# Patient Record
Sex: MALE | Employment: STUDENT | URBAN - METROPOLITAN AREA
[De-identification: names, ages, dates, MRNs, and addresses within clinical notes are randomized per-mention and may not be internally consistent; named-entity substitution may affect disease eponyms.]

---

## 2021-10-01 ENCOUNTER — HOSPITAL ENCOUNTER (EMERGENCY)
Facility: CLINIC | Age: 21
Discharge: HOME | End: 2021-10-01
Attending: INTERNAL MEDICINE | Admitting: INTERNAL MEDICINE

## 2021-10-01 ENCOUNTER — APPOINTMENT (OUTPATIENT)
Dept: GENERAL RADIOLOGY | Facility: CLINIC | Age: 21
End: 2021-10-01
Attending: FAMILY MEDICINE

## 2021-10-01 VITALS
DIASTOLIC BLOOD PRESSURE: 74 MMHG | HEART RATE: 82 BPM | SYSTOLIC BLOOD PRESSURE: 113 MMHG | TEMPERATURE: 98.8 F | OXYGEN SATURATION: 95 % | RESPIRATION RATE: 16 BRPM

## 2021-10-01 DIAGNOSIS — S60.221A CONTUSION OF RIGHT HAND, INITIAL ENCOUNTER: ICD-10-CM

## 2021-10-01 PROCEDURE — 73130 X-RAY EXAM OF HAND: CPT | Mod: 26 | Performed by: RADIOLOGY

## 2021-10-01 PROCEDURE — 99283 EMERGENCY DEPT VISIT LOW MDM: CPT | Performed by: INTERNAL MEDICINE

## 2021-10-01 PROCEDURE — 73100 X-RAY EXAM OF WRIST: CPT | Mod: RT,52

## 2021-10-01 PROCEDURE — 99284 EMERGENCY DEPT VISIT MOD MDM: CPT

## 2021-10-01 PROCEDURE — 73130 X-RAY EXAM OF HAND: CPT | Mod: RT

## 2021-10-01 PROCEDURE — 73100 X-RAY EXAM OF WRIST: CPT | Mod: 26 | Performed by: RADIOLOGY

## 2021-10-01 PROCEDURE — 73090 X-RAY EXAM OF FOREARM: CPT | Mod: 26 | Performed by: RADIOLOGY

## 2021-10-01 PROCEDURE — 73090 X-RAY EXAM OF FOREARM: CPT | Mod: RT

## 2021-10-01 RX ORDER — IBUPROFEN 600 MG/1
600 TABLET, FILM COATED ORAL ONCE
Status: DISCONTINUED | OUTPATIENT
Start: 2021-10-01 | End: 2021-10-01 | Stop reason: HOSPADM

## 2021-10-01 ASSESSMENT — ENCOUNTER SYMPTOMS
JOINT SWELLING: 0
ABDOMINAL PAIN: 0
SHORTNESS OF BREATH: 0
FEVER: 0
ARTHRALGIAS: 1
WEAKNESS: 0
NUMBNESS: 0

## 2021-10-01 NOTE — ED TRIAGE NOTES
Pt presents to ER with concerns of broken hand. Pt punched a wall after being angry approx 4hrs ago. Pt complains of pain in his right knucles, top of his hand, wrist, and forearm. Pt complains of tingling in his middle, ring, and pinky finger on his right hand. Remaining CMS intact. Pt is VSS in triage.

## 2021-10-02 NOTE — ED PROVIDER NOTES
ED Provider Note  [facility]      History     Chief Complaint   Patient presents with     Hand Pain     HPI  Milo Mcgovern is a 20 year old male who presents with right hand pain after punching a wall in anger 4 hours PTA. He has pain in the MCP knuckles of the middle, ring and little fingers. He has pain on movement of the fingers, but no numbness or weakness. He has no pain in the wrist of forearm, though the hand pain radiates somewhat proximally.    No past medical history on file.    No past surgical history on file.    No family history on file.    Social History     Tobacco Use     Smoking status: Not on file   Substance Use Topics     Alcohol use: Not on file        Review of Systems   Constitutional: Negative for fever.   Respiratory: Negative for shortness of breath.    Cardiovascular: Negative for chest pain.   Gastrointestinal: Negative for abdominal pain.   Musculoskeletal: Positive for arthralgias. Negative for joint swelling.   Neurological: Negative for weakness and numbness.   All other systems reviewed and are negative.        Physical Exam   BP: 113/74  Pulse: 82  Temp: 98.8  F (37.1  C)  Resp: 16  SpO2: 95 %  Physical Exam  Vitals and nursing note reviewed.   HENT:      Head: Normocephalic.      Right Ear: External ear normal.      Left Ear: External ear normal.      Nose: Nose normal.      Mouth/Throat:      Mouth: Mucous membranes are moist.   Eyes:      Extraocular Movements: Extraocular movements intact.      Pupils: Pupils are equal, round, and reactive to light.   Cardiovascular:      Rate and Rhythm: Normal rate.   Pulmonary:      Effort: Pulmonary effort is normal.   Musculoskeletal:         General: Swelling and tenderness present.      Right elbow: No swelling or deformity. No tenderness.      Right forearm: No swelling, deformity or tenderness.      Right wrist: Normal. No swelling, deformity or tenderness.      Right hand: Swelling and tenderness  present. No deformity. Decreased range of motion. Normal strength. Normal sensation. There is no disruption of two-point discrimination. Normal capillary refill.      Left hand: Normal.      Cervical back: Normal range of motion.   Skin:     General: Skin is warm and dry.      Comments: No hand wounds   Neurological:      General: No focal deficit present.      Mental Status: He is alert.   Psychiatric:         Mood and Affect: Mood normal.         Behavior: Behavior normal.         ED Course      Procedures       Labs/Imaging    Results for orders placed or performed during the hospital encounter of 10/01/21 (from the past 24 hour(s))   XR Wrist Right 1 View    Narrative    EXAM: XR FOREARM RIGHT 2 VIEWS, XR WRIST RIGHT 1 VIEW, XR HAND RT G/E 3 VW  LOCATION: [facility]  DATE/TIME: 10/1/2021 7:32 PM    INDICATION: Right forearm, wrist, and hand pain after trauma.  COMPARISON: None.      Impression    IMPRESSION: Right forearm, wrist, and hand. No fracture, erosion, or bone lesion. Normal joint alignment and spacing. Normal soft tissues.   XR Hand Right G/E 3 Views    Narrative    EXAM: XR FOREARM RIGHT 2 VIEWS, XR WRIST RIGHT 1 VIEW, XR HAND RT G/E 3 VW  LOCATION: [facility]  DATE/TIME: 10/1/2021 7:32 PM    INDICATION: Right forearm, wrist, and hand pain after trauma.  COMPARISON: None.      Impression    IMPRESSION: Right forearm, wrist, and hand. No fracture, erosion, or bone lesion. Normal joint alignment and spacing. Normal soft tissues.   XR Forearm Right 2 Views    Narrative    EXAM: XR FOREARM RIGHT 2 VIEWS, XR WRIST RIGHT 1 VIEW, XR HAND RT G/E 3 VW  LOCATION: [facility]  DATE/TIME: 10/1/2021 7:32 PM    INDICATION: Right forearm, wrist, and hand pain after trauma.  COMPARISON: None.      Impression    IMPRESSION: Right forearm, wrist, and hand. No fracture, erosion, or  bone lesion. Normal joint alignment and spacing. Normal soft tissues.       Medications   ibuprofen (ADVIL/MOTRIN) tablet 600 mg (has no administration in time range)        Assessments & Plan (with Medical Decision Making)   Impression:  Young male presents with right hand pain and swelling after punching a wall in anger 4 hours before arrival. He has no skin wounds. He has no fractures on XR of the hand, wrist or forearm. He has intact sensation and motor function. He is tender over the 3,4 and 5th MCP joints and has mild dorsolateral hand swelling on exam.    I have reviewed the nursing notes. I have reviewed the findings, diagnosis, plan and need for follow up with the patient.    New Prescriptions    No medications on file       Final diagnoses:   Contusion of right hand, initial encounter       --  Barber Pratt  [facility] EMERGENCY DEPARTMENT  10/1/2021     Barber Pratt MD  10/01/21 1958
